# Patient Record
Sex: MALE | Race: WHITE | NOT HISPANIC OR LATINO | URBAN - METROPOLITAN AREA
[De-identification: names, ages, dates, MRNs, and addresses within clinical notes are randomized per-mention and may not be internally consistent; named-entity substitution may affect disease eponyms.]

---

## 2017-04-19 ENCOUNTER — EMERGENCY (EMERGENCY)
Facility: HOSPITAL | Age: 27
LOS: 1 days | Discharge: HOME | End: 2017-04-19
Attending: EMERGENCY MEDICINE
Payer: MEDICAID

## 2017-04-19 VITALS
HEART RATE: 55 BPM | SYSTOLIC BLOOD PRESSURE: 115 MMHG | WEIGHT: 154.32 LBS | TEMPERATURE: 98 F | HEIGHT: 68.11 IN | RESPIRATION RATE: 18 BRPM | DIASTOLIC BLOOD PRESSURE: 54 MMHG | OXYGEN SATURATION: 98 %
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
## 2017-04-19 DIAGNOSIS — S20.212A CONTUSION OF LEFT FRONT WALL OF THORAX, INITIAL ENCOUNTER: ICD-10-CM
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
## 2017-04-19 PROCEDURE — 99283 EMERGENCY DEPT VISIT LOW MDM: CPT | Mod: 25

## 2017-04-19 PROCEDURE — 71101 X-RAY EXAM UNILAT RIBS/CHEST: CPT

## 2017-04-19 PROCEDURE — 71101 X-RAY EXAM UNILAT RIBS/CHEST: CPT | Mod: 26

## 2017-04-19 PROCEDURE — 96372 THER/PROPH/DIAG INJ SC/IM: CPT

## 2017-04-19 PROCEDURE — 99284 EMERGENCY DEPT VISIT MOD MDM: CPT

## 2017-04-19 RX ORDER — KETOROLAC TROMETHAMINE 30 MG/ML
60 SYRINGE (ML) INJECTION ONCE
Qty: 0 | Refills: 0 | Status: DISCONTINUED | OUTPATIENT
Start: 2017-04-19 | End: 2017-04-19

## 2017-04-19 RX ORDER — ACETAMINOPHEN 500 MG
2 TABLET ORAL
Qty: 60 | Refills: 0 | OUTPATIENT
Start: 2017-04-19 | End: 2017-04-24

## 2017-04-19 RX ADMIN — Medication 60 MILLIGRAM(S): at 21:37

## 2017-04-19 NOTE — ED ADULT NURSE NOTE - NS TRANSFER PATIENT BELONGINGS
Cell Phone/PDA (specify)/Other belongings/Electronic Device (specify)/Clothing/Jewelry/Money (specify)

## 2017-04-19 NOTE — ED PROVIDER NOTE - CHPI ED SYMPTOMS NEG
no numbness/no chills, no vomiting, no diarrhea, no shortness of breath/no weakness/no fever/no tingling

## 2017-04-19 NOTE — ED PROVIDER NOTE - NS ED MD SCRIBE ATTENDING SCRIBE SECTIONS
DISPOSITION/REVIEW OF SYSTEMS/HISTORY OF PRESENT ILLNESS/PHYSICAL EXAM/HIV/PAST MEDICAL/SURGICAL/SOCIAL HISTORY/VITAL SIGNS( Pullset)
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
## 2017-04-19 NOTE — ED PROVIDER NOTE - MEDICAL DECISION MAKING DETAILS
25 y/o M pt with L-sided rib pain x 5 days. Plan for x-ray to r/o fracture, provided pain medication, dc home.

## 2024-08-09 ENCOUNTER — EMERGENCY (EMERGENCY)
Facility: HOSPITAL | Age: 34
LOS: 1 days | Discharge: HOME | End: 2024-08-09
Attending: EMERGENCY MEDICINE
Payer: COMMERCIAL

## 2024-08-09 VITALS
SYSTOLIC BLOOD PRESSURE: 105 MMHG | DIASTOLIC BLOOD PRESSURE: 70 MMHG | RESPIRATION RATE: 16 BRPM | HEART RATE: 96 BPM | TEMPERATURE: 98 F | HEIGHT: 68 IN | OXYGEN SATURATION: 97 % | WEIGHT: 188.94 LBS

## 2024-08-09 PROCEDURE — 99284 EMERGENCY DEPT VISIT MOD MDM: CPT | Mod: 25

## 2024-08-09 PROCEDURE — 73030 X-RAY EXAM OF SHOULDER: CPT | Mod: 26,RT

## 2024-08-09 PROCEDURE — 90715 TDAP VACCINE 7 YRS/> IM: CPT

## 2024-08-09 PROCEDURE — 73562 X-RAY EXAM OF KNEE 3: CPT | Mod: 26,LT

## 2024-08-09 PROCEDURE — 70450 CT HEAD/BRAIN W/O DYE: CPT | Mod: MC

## 2024-08-09 PROCEDURE — 73562 X-RAY EXAM OF KNEE 3: CPT

## 2024-08-09 PROCEDURE — 73030 X-RAY EXAM OF SHOULDER: CPT

## 2024-08-09 PROCEDURE — 99285 EMERGENCY DEPT VISIT HI MDM: CPT

## 2024-08-09 PROCEDURE — 72125 CT NECK SPINE W/O DYE: CPT | Mod: MC

## 2024-08-09 PROCEDURE — 72125 CT NECK SPINE W/O DYE: CPT | Mod: 26,MC

## 2024-08-09 PROCEDURE — 70450 CT HEAD/BRAIN W/O DYE: CPT | Mod: 26,MC

## 2024-08-09 PROCEDURE — 90471 IMMUNIZATION ADMIN: CPT

## 2024-08-09 RX ORDER — ACETAMINOPHEN 500 MG
975 TABLET ORAL ONCE
Refills: 0 | Status: COMPLETED | OUTPATIENT
Start: 2024-08-09 | End: 2024-08-09

## 2024-08-09 RX ORDER — IBUPROFEN 200 MG
1 TABLET ORAL
Qty: 20 | Refills: 0
Start: 2024-08-09 | End: 2024-08-13

## 2024-08-09 RX ORDER — CLOSTRIDIUM TETANI TOXOID ANTIGEN (FORMALDEHYDE INACTIVATED), CORYNEBACTERIUM DIPHTHERIAE TOXOID ANTIGEN (FORMALDEHYDE INACTIVATED), BORDETELLA PERTUSSIS TOXOID ANTIGEN (GLUTARALDEHYDE INACTIVATED), BORDETELLA PERTUSSIS FILAMENTOUS HEMAGGLUTININ ANTIGEN (FORMALDEHYDE INACTIVATED), BORDETELLA PERTUSSIS PERTACTIN ANTIGEN, AND BORDETELLA PERTUSSIS FIMBRIAE 2/3 ANTIGEN 5; 2; 2.5; 5; 3; 5 [LF]/.5ML; [LF]/.5ML; UG/.5ML; UG/.5ML; UG/.5ML; UG/.5ML
0.5 INJECTION, SUSPENSION INTRAMUSCULAR ONCE
Refills: 0 | Status: COMPLETED | OUTPATIENT
Start: 2024-08-09 | End: 2024-08-09

## 2024-08-09 RX ADMIN — Medication 975 MILLIGRAM(S): at 22:24

## 2024-08-09 RX ADMIN — CLOSTRIDIUM TETANI TOXOID ANTIGEN (FORMALDEHYDE INACTIVATED), CORYNEBACTERIUM DIPHTHERIAE TOXOID ANTIGEN (FORMALDEHYDE INACTIVATED), BORDETELLA PERTUSSIS TOXOID ANTIGEN (GLUTARALDEHYDE INACTIVATED), BORDETELLA PERTUSSIS FILAMENTOUS HEMAGGLUTININ ANTIGEN (FORMALDEHYDE INACTIVATED), BORDETELLA PERTUSSIS PERTACTIN ANTIGEN, AND BORDETELLA PERTUSSIS FIMBRIAE 2/3 ANTIGEN 0.5 MILLILITER(S): 5; 2; 2.5; 5; 3; 5 INJECTION, SUSPENSION INTRAMUSCULAR at 22:24

## 2024-08-09 RX ADMIN — Medication 1 TABLET(S): at 22:24
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
## 2024-08-09 NOTE — ED ADULT TRIAGE NOTE - AS HEIGHT TYPE
Have Your Skin Lesions Been Treated?: not been treated
Is This A New Presentation, Or A Follow-Up?: Growths
How Severe Is Your Skin Lesion?: mild
stated
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
## 2024-08-09 NOTE — ED PROVIDER NOTE - NSFOLLOWUPCLINICS_GEN_ALL_ED_FT
[facility]  Orthopedics  [address] [place] [address]  Phone: [phone]  Fax: [phone]  Follow Up Time: 4-6 Days

## 2024-08-09 NOTE — ED PROVIDER NOTE - ATTENDING APP SHARED VISIT CONTRIBUTION OF CARE
left lateral knee tenderness to palpation with mild swelling. xray result discussed with [facility] radiologist who states the questionable irregularity at lateral tibial plateau/proximal fibula appears chronic in nature. patient with pain upon weight bearing, will place  in knee immobilizer orthopedics followup

## 2024-08-09 NOTE — ED PROVIDER NOTE - NSFOLLOWUPINSTRUCTIONS_ED_ALL_ED_FT
Acute Knee Pain, Adult  Many things can cause knee pain. Sometimes, knee pain is sudden (acute). It may be caused by damage, swelling, or irritation of the muscles and tissues that support your knee.    Pain may come from:  A fall.  An injury to the knee from twisting motions.  A hit to the knee.  Infection.  The pain often goes away on its own with time and rest. If the pain does not go away, tests may be done to find out what is causing the pain. These may include:  Imaging tests, such as an X-ray, MRI, CT scan, or ultrasound.  Joint aspiration. In this test, fluid is removed from the knee and checked.  Arthroscopy. In this test, a lighted tube is put in the knee and an image is shown on a screen.  A biopsy. In this test, a health care provider will remove a small piece of tissue for testing.  Follow these instructions at home:  If you have a knee sleeve or brace that can be taken off:    A person's knee with a brace on it.  Wear the knee sleeve or brace as told by your provider. Take it off only if your provider says that you can.  Check the skin around it every day. Tell your provider if you see problems.  Loosen the knee sleeve or brace if your toes tingle, are numb, or turn cold and blue.  Keep the knee sleeve or brace clean and dry.  Bathing    If the knee sleeve or brace is not waterproof:  Do not let it get wet.  Cover it when you take a bath or shower. Use a cover that does not let any water in.  Managing pain, stiffness, and swelling    Bag of ice on a towel on the skin.  If told, put ice on the area.  If you have a knee sleeve or brace that you can take off, remove it as told.  Put ice in a plastic bag.  Place a towel between your skin and the bag.  Leave the ice on for 20 minutes, 2–3 times a day.  If your skin turns bright red, take off the ice right away to prevent skin damage. The risk of damage is higher if you cannot feel pain, heat, or cold.  Move your toes often to reduce stiffness and swelling.  Raise the injured area above the level of your heart while you are sitting or lying down. Use a pillow to support your foot as needed.  If told, use an elastic bandage to put pressure (compression) on your injured knee. This may control swelling, give support, and help with discomfort.  Sleep with a pillow under your knee.  Activity    Rest your knee.  Do not do things that cause pain or make pain worse.  Do not stand or walk on your injured knee until you're told it's okay. Use crutches as told.  Avoid activities where both feet leave the ground at the same time and put stress on the joints. Avoid running, jumping rope, and doing jumping jacks.  Work with a physical therapist to make a safe exercise program if told. Physical therapy helps your knee move better and get stronger. Exercise as told.  General instructions    Take your medicines only as told by your provider.  If you are overweight, work with your provider and an expert in healthy eating, called a dietician, to set goals to lose weight. Being overweight can make your knee hurt more.  Do not smoke, vape, or use products with nicotine or tobacco in them. If you need help quitting, talk with your provider.  Return to normal activities when you are told. Ask what things are safe for you to do.  Watch for any changes in your symptoms.  Keep all follow-up visits. Your provider will check your healing and adjust treatments if needed.  Contact a health care provider if:  The knee pain does not stop.  The knee pain changes or gets worse.  You have a fever along with knee pain.  Your knee is red or feels warm when you touch it.  Your knee gives out or locks up.  Get help right away if:  Your knee swells and the swelling gets worse.  You cannot move your knee.  You have very bad knee pain that does not get better with medicine.  This information is not intended to replace advice given to you by your health care provider. Make sure you discuss any questions you have with your health care provider.    Document Revised: 02/12/2024 Document Reviewed: 02/12/2024  [email or web address] Patient Education © 2024 [employer]      Human Bite    WHAT YOU NEED TO KNOW:    What do I need to know about a human bite? Human bites are often more serious than animal bites. The wound may be deep and cause injury to bones, muscles, and other body parts. Wounds are more likely to become infected because of the germs in a person's mouth.    What are the signs and symptoms of a human bite?    Cuts, bruises, or swelling    Bleeding or pus    Redness, tenderness, and warmth around the wound    Difficulty moving the wounded area or deformed skin    Fever  How is a human bite diagnosed? Your healthcare provider will look closely at the injury, including the area around it. Your provider will check to see if the skin is broken and how deep the wound is. Your provider will also look for other problems or signs of infection. Your provider may check your health history, including other illnesses, medicines you take, and past surgeries. Tell your provider which vaccinations you have received, such as tetanus and hepatitis B. Tell your provider when and how you were bitten. You may need any of the following:    A wound culture is a test to grow and identify any germs in your wound. This helps healthcare providers find out if you have an infection and what medicine is best to treat it.    Blood tests are used to check for an infection.    X-ray pictures may show broken bones or other injuries.  How is a human bite treated? Treatment will depend on how severe the wound is, its location, and whether other areas are affected. It may also depend on the length of time you have had the injury. You may need any of the following:    The wound will be cleaned with soap and water or antibacterial solution. This helps wash away germs and decreases the risk for infection. Objects, dirt, or dead tissues will be removed from the open wound.    Medicines may be given to prevent or treat a bacterial infection, pain, swelling, or fever. Tetanus shots, antivirals, and immune globulins may be also be given.    Stitches may be used to close the wound.    Surgery may be needed to repair a broken bone or damaged joint, tendon, or nerve. Rarely, you may need surgery to rebuild the body part with the bite wound.  How should I care for my wound?    Wash your hands. Wash before and after you care for your wound to prevent infection.  Handwashing      Clean the wound with mild soap and water. Pat the area dry. Check the wound and the area around it. Look for any swelling, redness, or fluid oozing out of it. Do this as often as ordered by your healthcare provider. Keep the area clean to prevent infection.    Cover the wound with a layer of clean gauze bandage. The bandage should be wrapped snugly, but do not wrap it tightly. You should be able to put 2 fingers under the bandage. It is too tight if you feel tingling or lose feeling in that area.    Apply pressure to stop any bleeding. Use a clean cloth to apply direct pressure to the wound.    Sit or lie so the bite area is raised above your heart, if possible. This will decrease swelling. Put pillows under an injured leg when lying in bed. A sling may be used if your arm or hand is injured.  When should I seek immediate care?    You have trouble swallowing, and your jaw and neck are stiff.    You have trouble talking, walking, or breathing.    You have increased redness, numbness, or swelling in the bitten area.    Your wound does not stop bleeding even after you apply pressure.    Your pain is the same or worse even after you take pain medicine.    Your wound or bandage has pus or a bad smell, even if you clean it every day.  When should I call my doctor?    You have a fever.    You have numbness or tingling in the area of the bite.    You have pain or problems moving the injured area or get tender lumps in the groin or armpits.    You have questions or concerns about your condition or care.  CARE AGREEMENT:    You have the right to help plan your care. Learn about your health condition and how it may be treated. Discuss treatment options with your healthcare providers to decide what care you want to receive. You always have the right to refuse treatment.

## 2024-08-09 NOTE — ED PROVIDER NOTE - PATIENT PORTAL LINK FT
You can access the FollowMyHealth Patient Portal offered by [facility] by registering at the following website: [email or web address]. By joining [facility]’s FollowMyHealth portal, you will also be able to view your health information using other applications (apps) compatible with our system.

## 2024-08-12 PROBLEM — Z00.00 ENCOUNTER FOR PREVENTIVE HEALTH EXAMINATION: Status: ACTIVE | Noted: 2024-08-12

## 2024-08-19 ENCOUNTER — APPOINTMENT (OUTPATIENT)
Age: 34
End: 2024-08-19

## 2024-08-20 ENCOUNTER — APPOINTMENT (OUTPATIENT)
Age: 34
End: 2024-08-20
Payer: COMMERCIAL

## 2024-08-20 VITALS
HEART RATE: 110 BPM | SYSTOLIC BLOOD PRESSURE: 116 MMHG | HEIGHT: 68 IN | WEIGHT: 175 LBS | OXYGEN SATURATION: 94 % | DIASTOLIC BLOOD PRESSURE: 70 MMHG | BODY MASS INDEX: 26.52 KG/M2

## 2024-08-20 DIAGNOSIS — M23.92 UNSPECIFIED INTERNAL DERANGEMENT OF LEFT KNEE: ICD-10-CM

## 2024-08-20 DIAGNOSIS — M25.562 PAIN IN LEFT KNEE: ICD-10-CM

## 2024-08-20 PROCEDURE — 99204 OFFICE O/P NEW MOD 45 MIN: CPT
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
## 2024-08-20 NOTE — PHYSICAL EXAM
[de-identified] : Knee (left)  Inspection Skin: normal Effusion: small Bursa swelling: none  Palpation Tenderness: severe Location: lateral joint line and fibular head  Crepitus: none. Defect: none. Popliteal fullness: negative.  Flexion Active ROM: normal Passive ROM: normal    Extension Normal  Straight Leg Raise- can perform Motor strength Flexion: 5/5 Extension: 5/5  Sensory index Normal.  ACL/PCL tests Lachman test: stable Anterior drawer: stable Posterior drawer: stable  MCL/LCL tests MCL laxity: stable LCL laxity: stable  Patellofemoral tests Patellar grind test: negative Patellar apprehension: negative  Meniscal tests Jenifer's test: positive lateral [de-identified] : XR of left knee Date: 08/09/2024   Views: AP, lateral, oblique Performed at [facility]: Yes Impression: Joint space well maintained. No fracture.  These images were personally reviewed with original findings documented as above.

## 2024-08-23 RX ORDER — DICLOFENAC SODIUM 75 MG/1
75 TABLET, DELAYED RELEASE ORAL TWICE DAILY
Qty: 90 | Refills: 0 | Status: ACTIVE | COMMUNITY
Start: 2024-08-23 | End: 1900-01-01

## 2024-08-27 ENCOUNTER — APPOINTMENT (OUTPATIENT)
Dept: MRI IMAGING | Facility: CLINIC | Age: 34
End: 2024-08-27

## 2024-09-03 ENCOUNTER — APPOINTMENT (OUTPATIENT)
Dept: MRI IMAGING | Facility: CLINIC | Age: 34
End: 2024-09-03
Payer: COMMERCIAL

## 2024-09-03 PROCEDURE — 73721 MRI JNT OF LWR EXTRE W/O DYE: CPT | Mod: LT

## 2024-09-11 ENCOUNTER — APPOINTMENT (OUTPATIENT)
Age: 34
End: 2024-09-11
Payer: MEDICAID

## 2024-09-11 VITALS — BODY MASS INDEX: 26.52 KG/M2 | HEIGHT: 68 IN | WEIGHT: 175 LBS

## 2024-09-11 DIAGNOSIS — S82.142D DISPLACED BICONDYLAR FRACTURE OF LEFT TIBIA, SUBSEQUENT ENCOUNTER FOR CLOSED FRACTURE WITH ROUTINE HEALING: ICD-10-CM

## 2024-09-11 PROCEDURE — 99203 OFFICE O/P NEW LOW 30 MIN: CPT
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
## 2024-10-09 ENCOUNTER — TRANSCRIPTION ENCOUNTER (OUTPATIENT)
Age: 34
End: 2024-10-09

## 2025-02-11 NOTE — ED ADULT NURSE NOTE - LATERALITY
Cholesterol levels are still above goal but have improved.  I would recommend increasing pravastatin to 20 mg daily.  If agreeable please let me know so I can send new prescription and would then repeat cholesterol levels in 4 months  
generalized